# Patient Record
Sex: MALE | Race: WHITE | Employment: UNEMPLOYED | URBAN - METROPOLITAN AREA
[De-identification: names, ages, dates, MRNs, and addresses within clinical notes are randomized per-mention and may not be internally consistent; named-entity substitution may affect disease eponyms.]

---
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
## 2019-02-22 ENCOUNTER — OFFICE VISIT (OUTPATIENT)
Dept: PEDIATRIC ENDOCRINOLOGY | Age: 19
End: 2019-02-22

## 2019-02-22 VITALS
DIASTOLIC BLOOD PRESSURE: 76 MMHG | BODY MASS INDEX: 21.9 KG/M2 | HEART RATE: 59 BPM | WEIGHT: 150.4 LBS | OXYGEN SATURATION: 99 % | RESPIRATION RATE: 18 BRPM | SYSTOLIC BLOOD PRESSURE: 114 MMHG

## 2019-02-22 DIAGNOSIS — E10.9 TYPE 1 DIABETES MELLITUS WITHOUT COMPLICATION (HCC): Primary | ICD-10-CM

## 2019-02-22 LAB — HBA1C MFR BLD HPLC: 7.9 %

## 2019-02-22 RX ORDER — INSULIN GLARGINE 100 [IU]/ML
INJECTION, SOLUTION SUBCUTANEOUS
Qty: 30 ML | Refills: 4 | Status: SHIPPED | OUTPATIENT
Start: 2019-02-22 | End: 2020-04-21

## 2019-02-22 RX ORDER — PEN NEEDLE, DIABETIC 31 GX3/16"
NEEDLE, DISPOSABLE MISCELLANEOUS
Qty: 200 PEN NEEDLE | Refills: 4 | Status: SHIPPED | OUTPATIENT
Start: 2019-02-22

## 2019-02-22 NOTE — LETTER
2/22/2019 3:47 PM 
 
Patient:  Rafat Poster YOB: 2000 Date of Visit: 2/22/2019 Dear No Recipients: Thank you for referring Mr. Radha Martinez to me for evaluation/treatment. Below are the relevant portions of my assessment and plan of care. Chief Complaint Patient presents with  Diabetes f/u [address] 
[address] [facility] [address] [facility], [address] 
[phone] CC: F/U type 1 diabetes on injections History of present illness: 
 
Brian is a 22 y.o. male who [facility] for type 1 diabetes. He was present today with his girlfriend. Brian was originally diagnosed with diabetes in 2012. His last visit in diabetes clinic was on  1121/2018 and his hemoglobin A1c was 8.2%. Since then, he has remained well with no intercurrent illnesses, ED visits, or hospitalizations. He has had zero episodes of positive urine ketones since his last visit. Started college this fall and reports doing well with diabetes management at [facility] [place]. Blood glucoses:  Glucometer is available today. According to meter download, Brian is checking his BG an average of 2 times daily. The overall meter average is 136. See scanned chart Hypoglycemia: 3-4x/week Severe hypoglycemia requiring glucagon: none Hyperglycemia: about once/month. Negative ketones Insulin regimen: 
 
Lantus: 33units daily in the AM 
 
humalog: 
I:C : 1u:15g carbs ISF: 20 , target:100 Last Eye exam: earlier this year. Normal.  
 
Last flu shot: This flu season(11/2018) Medical ID: yes Screening labs: 
TSH Date Value Ref Range Status 12/26/2018 1.040 0.450 - 4.500 uIU/mL Final  
 
No components found for: Macarena Schmitz Lab Results Component Value Date/Time  Cholesterol, total 118 12/26/2018 04:29 PM  
 HDL Cholesterol 40 12/26/2018 04:29 PM  
 LDL, calculated 61 12/26/2018 04:29 PM  
 VLDL, calculated 17 12/26/2018 04:29 PM  
 Triglyceride 83 12/26/2018 04:29 PM  
 
 
 
 
Past Medical History:  
Diagnosis Date  Diabetes mellitus ([address]  Eczema  Unspecified adverse effect of anesthesia   
 grandmother gets \"very nausea\" Social History: In [facility] Review of systems: 
12 point ROS completed by me and is negative except as indicated above in HPI Medications: 
Current Outpatient Medications Medication Sig  Acetone, Urine, Test (KETOSTIX) strip Check urine ketones if blood sugar >350 or illness. Disp 2- 1 home,  1-school  glucose blood VI test strips (ACCU-CHEK ARCHANA) strip To test up to 6 times daily as directed.  insulin glargine (LANTUS SOLOSTAR U-100 INSULIN) 100 unit/mL (3 mL) inpn Inject 38 units daily.  Insulin Needles, Disposable, (JOSÉ MIGUEL PEN NEEDLE) 32 gauge x 5/32\" ndle Use to inject up to 100 units of insulin daily as directed  glucagon (GLUCAGON EMERGENCY KIT, HUMAN,) 1 mg injection Inject 1 ml into thigh muscle for severe hypogylcemia and semi unconsciousness. Disp 2- 1 home, 1 school  Lancets (ACCU-CHEK FASTCLIX LANCING DEV) misc To test up to 10 times daily as directed.  insulin lispro (HUMALOG KWIKPEN INSULIN) 100 unit/mL kwikpen Inject up to 100 units daily.  Acetone, Urine, Test (KETONE URINE TEST) strip To test prn  Blood-Glucose Meter (ONETOUCH VERIO FLEX) misc Use as directed No current facility-administered medications for this visit. Allergies: 
No Known Allergies Objective:  
 
 
Visit Vitals /76 (BP 1 Location: Right arm, BP Patient Position: Sitting) Pulse (!) 59 Resp 18 Wt 150 lb 6.4 oz (68.2 kg) SpO2 99% BMI 21.90 kg/m² No height on file for this encounter. Weight: 42 %ile (Z= -0.20) based on CDC (Boys, 2-20 Years) weight-for-age data using vitals from 2/22/2019. Height: No height on file for this encounter. BMI: Body mass index is 21.9 kg/m². Percentile: 36 %ile (Z= -0.36) based on CDC (Boys, 2-20 Years) BMI-for-age data using weight from 2/22/2019 and height from 11/21/2018. Change in weight: increase by 2.1kg in 3months Change in height: relatively unchanged In general, Nicki Fischer is alert, well-appearing and in no acute distress. HEENT: normocephalic, atraumatic. Pupils are equal, round and reactive to light. Extraocular movements are intact. Good dentition. Oropharynx is clear, mucous membranes moist. Neck is supple without lymphadenopathy. Thyroid is smooth and not enlarged. Chest: Clear to auscultation bilaterally with normal respiratory effort. CV: Normal S1/S2 without murmur. Abdomen is soft, nontender, nondistended, no hepatosplenomegaly. Skin is warm and well perfused. Injection sites:  clear without evidence of lipohypertrophy. Neuro demonstrates normal tone and strength throughout. Sexual development: stage deferred(previous exam adult) Laboratory data: 
No components found for: QUARTERMAIN Recent Results (from the past 12 hour(s)) AMB POC HEMOGLOBIN A1C Collection Time: 02/22/19  3:19 PM  
Result Value Ref Range Hemoglobin A1c (POC) 7.9 % Yearly screening labs done in 12/2018 showed normal lipid panel, normal celiac screen, normal thyroid screen. Insufficiency vitamin D level. Assessment:  
 
 
Nicki Fischer is a 22 y.o. male presenting for follow up of type 1 diabetes, under good control. Hemoglobin A1c today is 7.9% above ADA target of <7.5%, decreased from the last visit. BG averages improving with multiple lows. We would make some insulin dose changes. Send us records in a week for any insulin dose adjustment or sooner if you are having any lows. Reinforced the importance of checking BGs before meals and bolusing for all premeal BG and carbs. We would follow up on DEXCOM CGM G6.  
 
 
Medical ID recommended at all times. Return to clinic in 3 months. Plan: Reviewed growth charts and labs with family Reviewed hypoglycemia and how to manage hypoglycemia including when to use glucagon (for severe hypoglycemia, LOC,seizure) Reviewed ketones check and how to management positve ketones with family Hemoglobin A1C reviewed. Correlation between A1C and long term complications like neuropathy, nephropathy and retinopathy reviewed. Acute complications like diabetes ketoacidosis and dehydration and electrolyte abnormalities discussed Follow up in 3 months We discussed adult endo follow up Patient Instructions Seen for follow for type 1 diabetes.  HbA1c today is 7.9%.  Target is <7.5%.  
   
   
Plan Importance of compliance reinforced Send us records in a week to review for any insulin dose adjustements Review checking ketones when vomiting, 2 consecutive blood glucose above 350,  illness When trace or small drink more water and keep checking until negative. If moderate or large give us a call #[phone] Target before activity >120, if below get something with carbs,protein and fat (granula bar)  
   
Yearly eye exams are recommended after you have had diabetes for 3-5 years Dental exams every 6 months are recommended Flu vaccine is recommended every year, as early in the season as possible Medical ID should be worn at all times Continue rotating injection/insertion sites Annual labs are due: 12/2019 
   
Discussed diabetes and alcohol as well as diabetes and driving    
   
New insulin regimen 
   
Lantus: 33units daily in the AM 
   
Novolog: 
I:C : 1u:20g carbs 
  
 Target: 100 ISF: 20 Orders Placed This Encounter  AMB POC HEMOGLOBIN A1C  Acetone, Urine, Test (KETOSTIX) strip Sig: Check urine ketones if blood sugar >350 or illness. Disp 2- 1 home,  1-school Dispense:  100 Strip Refill:  4  
 glucose blood VI test strips (ACCU-CHEK ARCHANA) strip Sig: To test up to 6 times daily as directed. Dispense:  200 Strip Refill:  4  
 insulin glargine (LANTUS SOLOSTAR U-100 INSULIN) 100 unit/mL (3 mL) inpn Sig: Inject 38 units daily. Dispense:  30 mL Refill:  4  
 Insulin Needles, Disposable, (JOSÉ MIGUEL PEN NEEDLE) 32 gauge x 5/32\" ndle Sig: Use to inject up to 100 units of insulin daily as directed Dispense:  200 Pen Needle Refill:  4  
 glucagon (GLUCAGON EMERGENCY KIT, HUMAN,) 1 mg injection Sig: Inject 1 ml into thigh muscle for severe hypogylcemia and semi unconsciousness. Disp 2- 1 home, 1 school Dispense:  2 mL Refill:  1 Total time: 40minutes Time spent counseling patient/family: 50%. Reviewed hypoglycemia and how to management hypoglycemia including when to use glucagon. Discussed teaching his closet friend when and how to use glucagon. Chief Complaint Patient presents with  Diabetes f/u Insulin regimen: 
  
Lantus: 33 units daily in the AM, having lows has decreased it from 38 units.  
  
Humalog: 
I:C : 1unit:15g carbs 
  
ISF: 20 , target:100 Rotating sites well, legs, arms. Interested in [place], will send in old CMN from 8/2018 and new office visit. Transition paperwork given . If you have questions, please do not hesitate to call me. I look forward to following  Alek Lundy along with you.  
 
 
 
Sincerely, 
 
 
Rafia Badillo MD

## 2019-02-22 NOTE — PROGRESS NOTES
Chief Complaint   Patient presents with    Diabetes     f/u     Insulin regimen:     Lantus: 33 units daily in the AM, having lows has decreased it from 38 units.      Humalog:  I:C : 1unit:15g carbs     ISF: 20 , target:100    Rotating sites well, legs, arms. Interested in [place], will send in old CMN from 8/2018 and new office visit. Transition paperwork given .

## 2019-02-22 NOTE — PATIENT INSTRUCTIONS
Seen for follow for type 1 diabetes.  HbA1c today is 7.9%.  Target is <7.5%.           Plan  Importance of compliance reinforced    Send us records in a week to review for any insulin dose adjustements  Review checking ketones when vomiting, 2 consecutive blood glucose above 350,  illness  When trace or small drink more water and keep checking until negative.  If moderate or large give us a call #[phone]  Target before activity >120, if below get something with carbs,protein and fat (granula bar)       Yearly eye exams are recommended after you have had diabetes for 3-5 years  Dental exams every 6 months are recommended  Flu vaccine is recommended every year, as early in the season as possible  Medical ID should be worn at all times  Continue rotating injection/insertion sites  Annual labs are due: 12/2019      Discussed diabetes and alcohol as well as diabetes and driving          New insulin regimen      Lantus: 33units daily in the AM      Novolog:  I:C : 1u:20g carbs      Target: 100  ISF: 20

## 2019-02-22 NOTE — PROGRESS NOTES
[address] [facility] [address]  [address] [facility], [address]  [phone]        CC: F/U type 1 diabetes on injections    History of present illness:    Bhargavi Johnson is a 22 y.o. male who [facility] for type [facility] diabetes. He was present today with his girlfriend. Bhargavi Johnson was originally diagnosed with diabetes in 2012. His last visit in diabetes clinic was on  1121/2018 and his hemoglobin A1c was 8.2%. Since then, he has remained well with no intercurrent illnesses, ED visits, or hospitalizations. He has had zero episodes of positive urine ketones since his last visit. Started college this fall and reports doing well with diabetes management at [facility]. Blood glucoses:  Glucometer is available today. According to meter download, Bhargavi Johnson is checking his BG an average of 2 times daily. The overall meter average is 136. See scanned chart    Hypoglycemia: 3-4x/week  Severe hypoglycemia requiring glucagon: none  Hyperglycemia: about once/month. Negative ketones    Insulin regimen:    Lantus: 33units daily in the AM    humalog:  I:C : 1u:15g carbs    ISF: 20 , target:100    Last Eye exam: earlier this year. Normal.     Last flu shot: This flu season(11/2018)    Medical ID: yes  Screening labs:  TSH   Date Value Ref Range Status   12/26/2018 1.040 0.450 - 4.500 uIU/mL Final     No components found for: Prince Robin  Lab Results   Component Value Date/Time    Cholesterol, total 118 12/26/2018 04:29 PM    HDL Cholesterol 40 12/26/2018 04:29 PM    LDL, calculated 61 12/26/2018 04:29 PM    VLDL, calculated 17 12/26/2018 04:29 PM    Triglyceride 83 12/26/2018 04:29 PM           Past Medical History:   Diagnosis Date    Diabetes mellitus ([address]     Eczema     Unspecified adverse effect of anesthesia     grandmother gets \"very nausea\"         Social History:   In college      Review of systems:  12 point ROS completed by me and is negative except as indicated above in HPI    Medications:  Current Outpatient Medications   Medication Sig    Acetone, Urine, Test (KETOSTIX) strip Check urine ketones if blood sugar >350 or illness. Disp 2- 1 home,  1-school    glucose blood VI test strips (ACCU-CHEK ARCHANA) strip To test up to 6 times daily as directed.  insulin glargine (LANTUS SOLOSTAR U-100 INSULIN) 100 unit/mL (3 mL) inpn Inject 38 units daily.  Insulin Needles, Disposable, (JOSÉ MIGUEL PEN NEEDLE) 32 gauge x 5/32\" ndle Use to inject up to 100 units of insulin daily as directed    glucagon (GLUCAGON EMERGENCY KIT, HUMAN,) 1 mg injection Inject 1 ml into thigh muscle for severe hypogylcemia and semi unconsciousness. Disp 2- 1 home, 1 school    Lancets (ACCU-CHEK FASTCLIX LANCING DEV) misc To test up to 10 times daily as directed.  insulin lispro (HUMALOG KWIKPEN INSULIN) 100 unit/mL kwikpen Inject up to 100 units daily.  Acetone, Urine, Test (KETONE URINE TEST) strip To test prn    Blood-Glucose Meter (ONETOUCH VERIO FLEX) misc Use as directed     No current facility-administered medications for this visit. Allergies:  No Known Allergies        Objective:       Visit Vitals  /76 (BP 1 Location: Right arm, BP Patient Position: Sitting)   Pulse (!) 59   Resp 18   Wt 150 lb 6.4 oz (68.2 kg)   SpO2 99%   BMI 21.90 kg/m²     No height on file for this encounter. Weight: 42 %ile (Z= -0.20) based on CDC (Boys, 2-20 Years) weight-for-age data using vitals from 2/22/2019. Height: No height on file for this encounter. BMI: Body mass index is 21.9 kg/m². Percentile: 36 %ile (Z= -0.36) based on CDC (Boys, 2-20 Years) BMI-for-age data using weight from 2/22/2019 and height from 11/21/2018. Change in weight: increase by 2.1kg in 3months  Change in height: relatively unchanged    In general, Jam Hernandez is alert, well-appearing and in no acute distress. HEENT: normocephalic, atraumatic. Pupils are equal, round and reactive to light. Extraocular movements are intact. Good dentition. Oropharynx is clear, mucous membranes moist. Neck is supple without lymphadenopathy. Thyroid is smooth and not enlarged. Chest: Clear to auscultation bilaterally with normal respiratory effort. CV: Normal S1/S2 without murmur. Abdomen is soft, nontender, nondistended, no hepatosplenomegaly. Skin is warm and well perfused. Injection sites:  clear without evidence of lipohypertrophy. Neuro demonstrates normal tone and strength throughout. Sexual development: stage deferred(previous exam adult)    Laboratory data:  No components found for: [id number]    Recent Results (from the past 12 hour(s))   AMB POC HEMOGLOBIN A1C    Collection Time: 02/22/19  3:19 PM   Result Value Ref Range    Hemoglobin A1c (POC) 7.9 %     Yearly screening labs done in 12/2018 showed normal lipid panel, normal celiac screen, normal thyroid screen. Insufficiency vitamin D level. Assessment:       Bart Llamas is a 22 y.o. male presenting for follow up of type 1 diabetes, under good control. Hemoglobin A1c today is 7.9% above ADA target of <7.5%, decreased from the last visit. BG averages improving with multiple lows. We would make some insulin dose changes. Send us records in a week for any insulin dose adjustment or sooner if you are having any lows. Reinforced the importance of checking BGs before meals and bolusing for all premeal BG and carbs. We would follow up on DEXCOM CGM G6.       Medical ID recommended at all times. Return to clinic in 3 months. Plan:   Reviewed growth charts and labs with family  Reviewed hypoglycemia and how to manage hypoglycemia including when to use glucagon (for severe hypoglycemia, LOC,seizure)  Reviewed ketones check and how to management positve ketones with family  Hemoglobin A1C reviewed. Correlation between A1C and long term complications like neuropathy, nephropathy and retinopathy reviewed.  Acute complications like diabetes ketoacidosis and dehydration and electrolyte abnormalities discussed  Follow up in 3 months    We discussed adult endo follow up    Patient Instructions   Seen for follow for type 1 diabetes.  HbA1c today is 7.9%.  Target is <7.5%.           Plan  Importance of compliance reinforced    Send us records in a week to review for any insulin dose adjustements  Review checking ketones when vomiting, 2 consecutive blood glucose above 350,  illness  When trace or small drink more water and keep checking until negative. If moderate or large give us a call #[phone]  Target before activity >120, if below get something with carbs,protein and fat (granula bar)       Yearly eye exams are recommended after you have had diabetes for 3-5 years  Dental exams every 6 months are recommended  Flu vaccine is recommended every year, as early in the season as possible  Medical ID should be worn at all times  Continue rotating injection/insertion sites  Annual labs are due: 12/2019      Discussed diabetes and alcohol as well as diabetes and driving          New insulin regimen      Lantus: 33units daily in the AM      Novolog:  I:C : 1u:20g carbs      Target: 100  ISF: 20        Orders Placed This Encounter    AMB POC HEMOGLOBIN A1C    Acetone, Urine, Test (KETOSTIX) strip     Sig: Check urine ketones if blood sugar >350 or illness. Disp 2- 1 home,  1-school     Dispense:  100 Strip     Refill:  4    glucose blood VI test strips (ACCU-CHEK ARCHANA) strip     Sig: To test up to 6 times daily as directed. Dispense:  200 Strip     Refill:  4    insulin glargine (LANTUS SOLOSTAR U-100 INSULIN) 100 unit/mL (3 mL) inpn     Sig: Inject 38 units daily.      Dispense:  30 mL     Refill:  4    Insulin Needles, Disposable, (JOSÉ MIGUEL PEN NEEDLE) 32 gauge x 5/32\" ndle     Sig: Use to inject up to 100 units of insulin daily as directed     Dispense:  200 Pen Needle     Refill:  4    glucagon (GLUCAGON EMERGENCY KIT, HUMAN,) 1 mg injection     Sig: Inject 1 ml into thigh muscle for severe hypogylcemia and semi unconsciousness. Disp 2- 1 home, 1 school     Dispense:  2 mL     Refill:  1       Total time: 40minutes  Time spent counseling patient/family: 50%. Reviewed hypoglycemia and how to management hypoglycemia including when to use glucagon. Discussed teaching his closet friend when and how to use glucagon.

## 2019-05-24 ENCOUNTER — OFFICE VISIT (OUTPATIENT)
Dept: PEDIATRIC ENDOCRINOLOGY | Age: 19
End: 2019-05-24

## 2019-05-24 VITALS
DIASTOLIC BLOOD PRESSURE: 76 MMHG | SYSTOLIC BLOOD PRESSURE: 117 MMHG | OXYGEN SATURATION: 98 % | HEIGHT: 70 IN | HEART RATE: 66 BPM | BODY MASS INDEX: 21.07 KG/M2 | WEIGHT: 147.2 LBS

## 2019-05-24 DIAGNOSIS — E10.9 TYPE 1 DIABETES MELLITUS WITHOUT COMPLICATION (HCC): Primary | ICD-10-CM

## 2019-05-24 LAB — HBA1C MFR BLD HPLC: 7.7 %

## 2019-05-24 NOTE — PATIENT INSTRUCTIONS
Seen for follow for type 1 diabetes.  HbA1c today is 7.7%.  Target is <7.5%.           Plan  Importance of compliance reinforced    Send us records in a week to review for any insulin dose adjustements  Review checking ketones when vomiting, 2 consecutive blood glucose above 350,  illness  When trace or small drink more water and keep checking until negative.  If moderate or large give us a call #[phone]  Target before activity >120, if below get something with carbs,protein and fat (granula bar)       Yearly eye exams are recommended after you have had diabetes for 3-5 years  Dental exams every 6 months are recommended  Flu vaccine is recommended every year, as early in the season as possible  Medical ID should be worn at all times  Continue rotating injection/insertion sites  Annual labs are due: 12/2019      Discussed diabetes and alcohol as well as diabetes and driving          New insulin regimen      Lantus: 31units daily in the AM      Novolog:  I:C : 1u:20g carbs      Target: 100  ISF: 20

## 2019-05-24 NOTE — PROGRESS NOTES
[address]  [address] [facility], [address]  [phone]        CC: F/U type 1 diabetes on injections    History of present illness:    Gildardo Phelps is a 20 y.o. male who [facility] for type 1 diabetes. He was present today with his girlfriend. Gildardo Phelps was originally diagnosed with diabetes in 2012. His last visit in diabetes clinic was on  02/22/2018 and his hemoglobin A1c was 7.9%. Since then, he has remained well with no intercurrent illnesses, ED visits, or hospitalizations. He has had zero episodes of positive urine ketones since his last visit. Started college this fall and reports doing well with diabetes management at [facility]. Blood glucoses:  Glucometer is available today. According to meter download, Gildardo Phelps is checking his BG an average of 2 times daily. The overall meter average is 160. See scanned chart    Hypoglycemia: 3x/week  Severe hypoglycemia requiring glucagon: none  Hyperglycemia: about once/month. Negative ketones    Insulin regimen:    Lantus: 33units daily in the AM    humalog:  I:C : 1u:20g carbs    ISF: 20 , target:100    Last Eye exam: earlier this year. Normal.     Last flu shot: This flu season(11/2018)    Medical ID: yes  Screening labs:  TSH   Date Value Ref Range Status   12/26/2018 1.040 0.450 - 4.500 uIU/mL Final     No components found for: Claude Childs  Lab Results   Component Value Date/Time    Cholesterol, total 118 12/26/2018 04:29 PM    HDL Cholesterol 40 12/26/2018 04:29 PM    LDL, calculated 61 12/26/2018 04:29 PM    VLDL, calculated 17 12/26/2018 04:29 PM    Triglyceride 83 12/26/2018 04:29 PM           Past Medical History:   Diagnosis Date    Diabetes mellitus ([address]     Eczema     Unspecified adverse effect of anesthesia     grandmother gets \"very nausea\"         Social History:   In [facility]      Review of systems:  12 point ROS completed by me and is negative except as indicated above in HPI    Medications:  Current Outpatient Medications   Medication Sig    Acetone, Urine, Test (KETOSTIX) strip Check urine ketones if blood sugar >350 or illness. Disp 2- 1 home,  1-school    glucose blood VI test strips (ACCU-CHEK ARCHANA) strip To test up to 6 times daily as directed.  insulin glargine (LANTUS SOLOSTAR U-100 INSULIN) 100 unit/mL (3 mL) inpn Inject 38 units daily.  Insulin Needles, Disposable, (JOSÉ MIGUEL PEN NEEDLE) 32 gauge x 5/32\" ndle Use to inject up to 100 units of insulin daily as directed    glucagon (GLUCAGON EMERGENCY KIT, HUMAN,) 1 mg injection Inject 1 ml into thigh muscle for severe hypogylcemia and semi unconsciousness. Disp 2- 1 home, 1 school    Lancets (ACCU-CHEK FASTCLIX LANCING DEV) misc To test up to 10 times daily as directed.  insulin lispro (HUMALOG KWIKPEN INSULIN) 100 unit/mL kwikpen Inject up to 100 units daily.  Acetone, Urine, Test (KETONE URINE TEST) strip To test prn    Blood-Glucose Meter (ONETOUCH VERIO FLEX) misc Use as directed     No current facility-administered medications for this visit. Allergies:  No Known Allergies        Objective:       Visit Vitals  /76 (BP 1 Location: Right arm, BP Patient Position: Sitting)   Pulse 66   Ht 5' 9.53\" (1.766 m)   Wt 147 lb 3.2 oz (66.8 kg)   SpO2 98%   BMI 21.41 kg/m²     Growth percentile SmartLinks can only be used for patients less than 21years old. Weight: Facility age limit for growth percentiles is 20 years. Height: Facility age limit for growth percentiles is 20 years. BMI: Body mass index is 21.41 kg/m². Percentile: Facility age limit for growth percentiles is 20 years. Wt Readings from Last 3 Encounters:   05/24/19 147 lb 3.2 oz (66.8 kg)   02/22/19 150 lb 6.4 oz (68.2 kg) (42 %, Z= -0.20)*   11/21/18 145 lb 12.8 oz (66.1 kg) (36 %, Z= -0.37)*     * Growth percentiles are based on CDC (Boys, 2-20 Years) data.      Ht Readings from Last 3 Encounters:   05/24/19 5' 9.53\" (1.766 m) 11/21/18 5' 9.49\" (1.765 m) (49 %, Z= -0.04)*   08/01/18 5' 9.29\" (1.76 m) (46 %, Z= -0.10)*     * Growth percentiles are based on [facility] (Boys, 2-20 Years) data. Body mass index is 21.41 kg/m². Facility age limit for growth percentiles is 20 years. Facility age limit for growth percentiles is 20 years. Facility age limit for growth percentiles is 20 years. In general, Manoj Conroy is alert, well-appearing and in no acute distress. HEENT: normocephalic, atraumatic. Pupils are equal, round and reactive to light. Extraocular movements are intact. Good dentition. Oropharynx is clear, mucous membranes moist. Neck is supple without lymphadenopathy. Thyroid is smooth and not enlarged. Chest: Clear to auscultation bilaterally with normal respiratory effort. CV: Normal S1/S2 without murmur. Abdomen is soft, nontender, nondistended, no hepatosplenomegaly. Skin is warm and well perfused. Injection sites:  clear without evidence of lipohypertrophy. Neuro demonstrates normal tone and strength throughout. Sexual development: stage deferred(previous exam adult)    Laboratory data:  No components found for: [id number]    Recent Results (from the past 12 hour(s))   AMB POC HEMOGLOBIN A1C    Collection Time: 05/24/19 10:29 AM   Result Value Ref Range    Hemoglobin A1c (POC) 7.7 %     Yearly screening labs done in 12/2018 showed normal lipid panel, normal celiac screen, normal thyroid screen. Insufficiency vitamin D level. Assessment:       Manoj Conroy is a 20 y.o. male presenting for follow up of type 1 diabetes, under good control. Hemoglobin A1c today is 7.7% above ADA target of <7.5%, decreased from the last visit. BG averages improving with multiple lows. We would make some insulin dose changes today as shown below. Send us records in a week for any insulin dose adjustment or sooner if you are having any lows. Reinforced the importance of checking BGs before meals and bolusing for all premeal BG and carbs.  He would follow up on DEXCOM CGM G6.       Medical ID recommended at all times. Return to clinic in 3 months. Plan:   Reviewed growth charts and labs with family  Reviewed hypoglycemia and how to manage hypoglycemia including when to use glucagon (for severe hypoglycemia, LOC,seizure)  Reviewed ketones check and how to management positve ketones with family  Hemoglobin A1C reviewed. Correlation between A1C and long term complications like neuropathy, nephropathy and retinopathy reviewed. Acute complications like diabetes ketoacidosis and dehydration and electrolyte abnormalities discussed  Follow up in 3 months    We again discussed adult endo follow up. He would call MD to make an appointment. Asked him to let us know once he had an appointment. Patient Instructions   Seen for follow for type 1 diabetes.  HbA1c today is 7.7%.  Target is <7.5%.           Plan  Importance of compliance reinforced    Send us records in a week to review for any insulin dose adjustements  Review checking ketones when vomiting, 2 consecutive blood glucose above 350,  illness  When trace or small drink more water and keep checking until negative. If moderate or large give us a call #[phone]  Target before activity >120, if below get something with carbs,protein and fat (granula bar)       Yearly eye exams are recommended after you have had diabetes for 3-5 years  Dental exams every 6 months are recommended  Flu vaccine is recommended every year, as early in the season as possible  Medical ID should be worn at all times  Continue rotating injection/insertion sites  Annual labs are due: 12/2019      Discussed diabetes and alcohol as well as diabetes and driving          New insulin regimen      Lantus: 31units daily in the AM      Novolog:  I:C : 1u:20g carbs      Target: 100  ISF: 20        Orders Placed This Encounter    AMB POC HEMOGLOBIN A1C       Total time: 40minutes  Time spent counseling patient/family: 50%.  Reviewed hypoglycemia and how to management hypoglycemia including when to use glucagon. Discussed teaching his closet friend when and how to use glucagon.

## 2019-05-24 NOTE — LETTER
5/24/2019 12:17 PM 
 
Patient:  Pennie Clarke YOB: 2000 Date of Visit: 5/24/2019 Dear No Recipients: Thank you for referring Mr. Thomas Diana to me for evaluation/treatment. Below are the relevant portions of my assessment and plan of care. Mingo Rivera. 
[address] [facility] [address] [place], [address] 
[phone] CC: F/U type 1 diabetes on injections History of present illness: 
 
Crispin Calderon is a 20 y.o. male who is [facility] for type 1 diabetes. He was present today with his girlfriend. Crispin Calderon was originally diagnosed with diabetes in 2012. His last visit in diabetes clinic was on  02/22/2018 and his hemoglobin A1c was 7.9%. Since then, he has remained well with no intercurrent illnesses, ED visits, or hospitalizations. He has had zero episodes of positive urine ketones since his last visit. Started college this fall and reports doing well with diabetes management at [facility]. Blood glucoses:  Glucometer is available today. According to meter download, Crispin Calderon is checking his BG an average of 2 times daily. The overall meter average is 160. See scanned chart Hypoglycemia: 3x/week Severe hypoglycemia requiring glucagon: none Hyperglycemia: about once/month. Negative ketones Insulin regimen: 
 
Lantus: 33units daily in the AM 
 
humalog: 
I:C : 1u:20g carbs ISF: 20 , target:100 Last Eye exam: earlier this year. Normal.  
 
Last flu shot: This flu season(11/2018) Medical ID: yes Screening labs: 
TSH Date Value Ref Range Status 12/26/2018 1.040 0.450 - 4.500 uIU/mL Final  
 
No components found for: Nasim Hagan Lab Results Component Value Date/Time Cholesterol, total 118 12/26/2018 04:29 PM  
 HDL Cholesterol 40 12/26/2018 04:29 PM  
 LDL, calculated 61 12/26/2018 04:29 PM  
 VLDL, calculated 17 12/26/2018 04:29 PM  
 Triglyceride 83 12/26/2018 04:29 PM  
 
 
 
 
Past Medical History: Diagnosis Date  Diabetes mellitus ([address]  Eczema  Unspecified adverse effect of anesthesia   
 grandmother gets \"very nausea\" Social History: In [facility] Review of systems: 
12 point ROS completed by me and is negative except as indicated above in HPI Medications: 
Current Outpatient Medications Medication Sig  Acetone, Urine, Test (KETOSTIX) strip Check urine ketones if blood sugar >350 or illness. Disp 2- 1 home,  1-school  glucose blood VI test strips (ACCU-CHEK ARCHANA) strip To test up to 6 times daily as directed.  insulin glargine (LANTUS SOLOSTAR U-100 INSULIN) 100 unit/mL (3 mL) inpn Inject 38 units daily.  Insulin Needles, Disposable, (JOSÉ MIGUEL PEN NEEDLE) 32 gauge x 5/32\" ndle Use to inject up to 100 units of insulin daily as directed  glucagon (GLUCAGON EMERGENCY KIT, HUMAN,) 1 mg injection Inject 1 ml into thigh muscle for severe hypogylcemia and semi unconsciousness. Disp 2- 1 home, 1 school  Lancets (ACCU-CHEK FASTCLIX LANCING DEV) misc To test up to 10 times daily as directed.  insulin lispro (HUMALOG KWIKPEN INSULIN) 100 unit/mL kwikpen Inject up to 100 units daily.  Acetone, Urine, Test (KETONE URINE TEST) strip To test prn  Blood-Glucose Meter (ONETOUCH VERIO FLEX) misc Use as directed No current facility-administered medications for this visit. Allergies: 
No Known Allergies Objective:  
 
 
Visit Vitals /76 (BP 1 Location: Right arm, BP Patient Position: Sitting) Pulse 66 Ht 5' 9.53\" (1.766 m) Wt 147 lb 3.2 oz (66.8 kg) SpO2 98% BMI 21.41 kg/m² Growth percentile SmartLinks can only be used for patients less than 21years old. Weight: Facility age limit for growth percentiles is 20 years. Height: Facility age limit for growth percentiles is 20 years. BMI: Body mass index is 21.41 kg/m². Percentile: Facility age limit for growth percentiles is 20 years. Wt Readings from Last 3 Encounters: 05/24/19 147 lb 3.2 oz (66.8 kg) 02/22/19 150 lb 6.4 oz (68.2 kg) (42 %, Z= -0.20)*  
11/21/18 145 lb 12.8 oz (66.1 kg) (36 %, Z= -0.37)* * Growth percentiles are based on [facility] (Boys, 2-20 Years) data. Ht Readings from Last 3 Encounters:  
05/24/19 5' 9.53\" (1.766 m)  
11/21/18 5' 9.49\" (1.765 m) (49 %, Z= -0.04)*  
08/01/18 5' 9.29\" (1.76 m) (46 %, Z= -0.10)* * Growth percentiles are based on [facility] (Boys, 2-20 Years) data. Body mass index is 21.41 kg/m². Facility age limit for growth percentiles is 20 years. Facility age limit for growth percentiles is 20 years. Facility age limit for growth percentiles is 20 years. In general, Steven Sifuentes is alert, well-appearing and in no acute distress. HEENT: normocephalic, atraumatic. Pupils are equal, round and reactive to light. Extraocular movements are intact. Good dentition. Oropharynx is clear, mucous membranes moist. Neck is supple without lymphadenopathy. Thyroid is smooth and not enlarged. Chest: Clear to auscultation bilaterally with normal respiratory effort. CV: Normal S1/S2 without murmur. Abdomen is soft, nontender, nondistended, no hepatosplenomegaly. Skin is warm and well perfused. Injection sites:  clear without evidence of lipohypertrophy. Neuro demonstrates normal tone and strength throughout. Sexual development: stage deferred(previous exam adult) Laboratory data: 
No components found for: QUARTERMAIN Recent Results (from the past 12 hour(s)) AMB POC HEMOGLOBIN A1C Collection Time: 05/24/19 10:29 AM  
Result Value Ref Range Hemoglobin A1c (POC) 7.7 % Yearly screening labs done in 12/2018 showed normal lipid panel, normal celiac screen, normal thyroid screen. Insufficiency vitamin D level. Assessment: Kathy Mai is a 20 y.o. male presenting for follow up of type 1 diabetes, under good control. Hemoglobin A1c today is 7.7% above ADA target of <7.5%, decreased from the last visit. BG averages improving with multiple lows. We would make some insulin dose changes today as shown below. Send us records in a week for any insulin dose adjustment or sooner if you are having any lows. Reinforced the importance of checking BGs before meals and bolusing for all premeal BG and carbs. He would follow up on DEXCOM CGM G6.  
 
 
Medical ID recommended at all times. Return to clinic in 3 months. Plan:  
Reviewed growth charts and labs with family Reviewed hypoglycemia and how to manage hypoglycemia including when to use glucagon (for severe hypoglycemia, LOC,seizure) Reviewed ketones check and how to management positve ketones with family Hemoglobin A1C reviewed. Correlation between A1C and long term complications like neuropathy, nephropathy and retinopathy reviewed. Acute complications like diabetes ketoacidosis and dehydration and electrolyte abnormalities discussed Follow up in 3 months We again discussed adult endo follow up. He would call MD to make an appointment. Asked him to let us know once he had an appointment. Patient Instructions Seen for follow for type 1 diabetes.  HbA1c today is 7.7%.  Target is <7.5%.  
   
   
Plan Importance of compliance reinforced Send us records in a week to review for any insulin dose adjustements Review checking ketones when vomiting, 2 consecutive blood glucose above 350,  illness When trace or small drink more water and keep checking until negative. If moderate or large give us a call #[phone] Target before activity >120, if below get something with carbs,protein and fat (granula bar)  
   
Yearly eye exams are recommended after you have had diabetes for 3-5 years Dental exams every 6 months are recommended Flu vaccine is recommended every year, as early in the season as possible Medical ID should be worn at all times Continue rotating injection/insertion sites Annual labs are due: 12/2019 
   
Discussed diabetes and alcohol as well as diabetes and driving    
   
New insulin regimen 
   
Lantus: 31units daily in the AM 
   
Novolog: 
I:C : 1u:20g carbs 
  
 Target: 100 ISF: 20 Orders Placed This Encounter  AMB POC HEMOGLOBIN A1C Total time: 40minutes Time spent counseling patient/family: 50%. Reviewed hypoglycemia and how to management hypoglycemia including when to use glucagon. Discussed teaching his closet friend when and how to use glucagon. If you have questions, please do not hesitate to call me. I look forward to following Mr. Lyle Areli along with you.  
 
 
 
Sincerely, 
 
 
Maddie Johnson MD

## 2019-07-16 ENCOUNTER — TELEPHONE (OUTPATIENT)
Dept: PEDIATRIC ENDOCRINOLOGY | Age: 19
End: 2019-07-16

## 2019-07-16 NOTE — TELEPHONE ENCOUNTER
07/16/19  10:28 AM    Left VM with Jay Jay Mayer to call and discuss Dexcom and if he was still interested.

## 2019-08-09 ENCOUNTER — OFFICE VISIT (OUTPATIENT)
Dept: PEDIATRIC ENDOCRINOLOGY | Age: 19
End: 2019-08-09

## 2019-08-09 VITALS
RESPIRATION RATE: 16 BRPM | HEART RATE: 57 BPM | TEMPERATURE: 97.7 F | SYSTOLIC BLOOD PRESSURE: 119 MMHG | WEIGHT: 148.2 LBS | BODY MASS INDEX: 21.22 KG/M2 | OXYGEN SATURATION: 99 % | HEIGHT: 70 IN | DIASTOLIC BLOOD PRESSURE: 70 MMHG

## 2019-08-09 DIAGNOSIS — E10.9 TYPE 1 DIABETES MELLITUS WITHOUT COMPLICATION (HCC): Primary | ICD-10-CM

## 2019-08-09 LAB — HBA1C MFR BLD HPLC: 8.7 %

## 2019-08-09 NOTE — PROGRESS NOTES
Chief Complaint   Patient presents with    Follow-up     Diabetes     Pt stated he is here to transfer care to an adult endocrinologist.

## 2019-08-09 NOTE — LETTER
8/9/19 Patient: Basil Millan YOB: 2000 Date of Visit: 8/9/2019 Ratna Ca MD 
VIA Dear Ratna Ca MD, Thank you for referring Mr. Jerri Alvarez to [address] [place] [address] for evaluation. My notes for this consultation are attached. Chief Complaint Patient presents with  Follow-up Diabetes Pt stated he is here to transfer care to an adult endocrinologist. 
 
[address] 
[address] [facility] [address] [place], [address] 
[phone] CC: F/U type 1 diabetes on injections History of present illness: 
 
Erin Roberts is a 20 y.o. male who [facility] for type 1 diabetes. He was present today with his girlfriend. Erin Roberts was originally diagnosed with diabetes in 2012. His last visit in diabetes clinic was on  05/24/2018 and his hemoglobin A1c was 7.7%. Since then, he has remained well with no intercurrent illnesses, ED visits, or hospitalizations. He has had zero episodes of positive urine ketones since his last visit. Started college last fall and reports doing well with diabetes management at [facility]. Blood glucoses:  Glucometer is available today. According to meter download, Erin Roberts is checking his BG an average of 1.8 times daily. The overall meter average is 189. See scanned chart Hypoglycemia: 2x/week Severe hypoglycemia requiring glucagon: none Hyperglycemia: >300 about once/week. Negative ketones Insulin regimen: 
 
Lantus: 33units daily in the AM 
 
humalog: 
I:C : 1u:20g carbs ISF: 20 , target:100 Last Eye exam: earlier this year. Normal.  
 
Last flu shot: This flu season(11/2018) Medical ID: yes Screening labs: 
TSH Date Value Ref Range Status 12/26/2018 1.040 0.450 - 4.500 uIU/mL Final  
 
No components found for: Linda Hal Lab Results Component Value Date/Time  Cholesterol, total 118 12/26/2018 04:29 PM  
 HDL Cholesterol 40 12/26/2018 04:29 PM  
 LDL, calculated 61 12/26/2018 04:29 PM  
 VLDL, calculated 17 12/26/2018 04:29 PM  
 Triglyceride 83 12/26/2018 04:29 PM  
 
 
 
 
Past Medical History:  
Diagnosis Date  Diabetes mellitus ([address]  Eczema  Unspecified adverse effect of anesthesia   
 grandmother gets \"very nausea\" Social History: In [facility] Review of systems: 
12 point ROS completed by me and is negative except as indicated above in HPI Medications: 
Current Outpatient Medications Medication Sig  Acetone, Urine, Test (KETOSTIX) strip Check urine ketones if blood sugar >350 or illness. Disp 2- 1 home,  1-school  glucose blood VI test strips (ACCU-CHEK ARCHANA) strip To test up to 6 times daily as directed.  insulin glargine (LANTUS SOLOSTAR U-100 INSULIN) 100 unit/mL (3 mL) inpn Inject 38 units daily.  Insulin Needles, Disposable, (JOSÉ MIGUEL PEN NEEDLE) 32 gauge x 5/32\" ndle Use to inject up to 100 units of insulin daily as directed  glucagon (GLUCAGON EMERGENCY KIT, HUMAN,) 1 mg injection Inject 1 ml into thigh muscle for severe hypogylcemia and semi unconsciousness. Disp 2- 1 home, 1 school  Lancets (ACCU-CHEK FASTCLIX LANCING DEV) misc To test up to 10 times daily as directed.  insulin lispro (HUMALOG KWIKPEN INSULIN) 100 unit/mL kwikpen Inject up to 100 units daily.  Acetone, Urine, Test (KETONE URINE TEST) strip To test prn  Blood-Glucose Meter (ONETOUCH VERIO FLEX) misc Use as directed No current facility-administered medications for this visit. Allergies: 
No Known Allergies Objective:  
 
 
Visit Vitals /70 (BP 1 Location: Left arm, BP Patient Position: Sitting) Pulse (!) 57 Temp 97.7 °F (36.5 °C) (Oral) Resp 16 Ht 5' 10.08\" (1.78 m) Wt 148 lb 3.2 oz (67.2 kg) SpO2 99% BMI 21.22 kg/m² Growth percentile SmartLinks can only be used for patients less than 21years old. Weight: Facility age limit for growth percentiles is 20 years. Height: Facility age limit for growth percentiles is 20 years. BMI: Body mass index is 21.22 kg/m². Percentile: Facility age limit for growth percentiles is 20 years. Wt Readings from Last 3 Encounters:  
08/09/19 148 lb 3.2 oz (67.2 kg) 05/24/19 147 lb 3.2 oz (66.8 kg) 02/22/19 150 lb 6.4 oz (68.2 kg) (42 %, Z= -0.20)* * Growth percentiles are based on CDC (Boys, 2-20 Years) data. Ht Readings from Last 3 Encounters:  
08/09/19 5' 10.08\" (1.78 m) 05/24/19 5' 9.53\" (1.766 m)  
11/21/18 5' 9.49\" (1.765 m) (49 %, Z= -0.04)* * Growth percentiles are based on [facility] (Boys, 2-20 Years) data. Body mass index is 21.22 kg/m². Facility age limit for growth percentiles is 20 years. Facility age limit for growth percentiles is 20 years. Facility age limit for growth percentiles is 20 years. In general, Alena Chiu is alert, well-appearing and in no acute distress. HEENT: normocephalic, atraumatic. Pupils are equal, round and reactive to light. Extraocular movements are intact. Good dentition. Oropharynx is clear, mucous membranes moist. Neck is supple without lymphadenopathy. Thyroid is smooth and not enlarged. Chest: Clear to auscultation bilaterally with normal respiratory effort. CV: Normal S1/S2 without murmur. Abdomen is soft, nontender, nondistended, no hepatosplenomegaly. Skin is warm and well perfused. Injection sites:  clear without evidence of lipohypertrophy. Neuro demonstrates normal tone and strength throughout. Sexual development: stage deferred(previous exam adult) Laboratory data: 
No components found for: QUARTERMAIN Recent Results (from the past 12 hour(s)) AMB POC HEMOGLOBIN A1C Collection Time: 08/09/19  9:10 AM  
Result Value Ref Range Hemoglobin A1c (POC) 8.7 % Yearly screening labs done in 12/2018 showed normal lipid panel, normal celiac screen, normal thyroid screen. Insufficiency vitamin D level. Assessment:  
 
 
Felipe Velazco is a 20 y.o. male presenting for follow up of type 1 diabetes, under good control. Hemoglobin A1c today is 8.7% above ADA target of <7.5%, increased from the last visit. BG averages slightly above target with occasional lows. He is not checking BGs as recommended. Stressed the importance of checking BGs at least 4x/day and giving insulin for all premeal BGs and carbs. We would make no insulin dose changes today as shown below. Send us records in a week for any insulin dose adjustment or sooner if you are having any lows. Reinforced the importance of checking BGs before meals and bolusing for all premeal BG and carbs. He would follow up on DEXCOM CGM G6.  
 
 
Medical ID recommended at all times. Return to clinic in 3 months. He would make an appointment with adult endocrine Plan:  
Reviewed growth charts and labs with family Reviewed hypoglycemia and how to manage hypoglycemia including when to use glucagon (for severe hypoglycemia, LOC,seizure) Reviewed ketones check and how to management positve ketones with family Hemoglobin A1C reviewed. Correlation between A1C and long term complications like neuropathy, nephropathy and retinopathy reviewed. Acute complications like diabetes ketoacidosis and dehydration and electrolyte abnormalities discussed Follow up in 3 months Patient Instructions Seen for follow for type 1 diabetes.  HbA1c today is 8.7%.  Target is <7.5%.  
   
   
Plan Importance of compliance reinforced Send us records in a week to review for any insulin dose adjustements Review checking ketones when vomiting, 2 consecutive blood glucose above 350,  illness When trace or small drink more water and keep checking until negative. If moderate or large give us a call #[phone] Target before activity >120, if below get something with carbs,protein and fat (granula bar)  
   
Yearly eye exams are recommended after you have had diabetes for 3-5 years Dental exams every 6 months are recommended Flu vaccine is recommended every year, as early in the season as possible Medical ID should be worn at all times Continue rotating injection/insertion sites Annual labs are due: 12/2019 
   
Discussed diabetes and alcohol as well as diabetes and driving    
   
New insulin regimen 
   
Lantus: 33units daily in the AM 
   
Novolog: 
I:C : 1u:20g carbs 
  
 Target: 100 ISF: 20 Orders Placed This Encounter  AMB POC HEMOGLOBIN A1C Total time: 40minutes Time spent counseling patient/family: 50%. Reviewed hypoglycemia and how to management hypoglycemia including when to use glucagon. If you have questions, please do not hesitate to call me. I look forward to following your patient along with you.  
 
 
Sincerely, 
 
Margaret Mendes MD

## 2019-08-09 NOTE — PATIENT INSTRUCTIONS
Seen for follow for type 1 diabetes.  HbA1c today is 8.7%.  Target is <7.5%.  
   
   
Plan Importance of compliance reinforced Send us records in a week to review for any insulin dose adjustements Review checking ketones when vomiting, 2 consecutive blood glucose above 350,  illness When trace or small drink more water and keep checking until negative. If moderate or large give us a call #[phone] Target before activity >120, if below get something with carbs,protein and fat (granula bar)  
   
Yearly eye exams are recommended after you have had diabetes for 3-5 years Dental exams every 6 months are recommended Flu vaccine is recommended every year, as early in the season as possible Medical ID should be worn at all times Continue rotating injection/insertion sites Annual labs are due: 12/2019 
   
Discussed diabetes and alcohol as well as diabetes and driving    
   
New insulin regimen 
   
Lantus: 33units daily in the AM 
   
Novolog: 
I:C : 1u:20g carbs 
  
 Target: 100 ISF: 20

## 2019-08-09 NOTE — PROGRESS NOTES
[address]  [address] [facility], [address]  [phone]        CC: F/U type 1 diabetes on injections    History of present illness:    Naomi Tang is a 20 y.o. male who is [facility] in [facility] for type 1 diabetes. He was present today with his girlfriend. Naomi Tang was originally diagnosed with diabetes in 2012. His last visit in diabetes clinic was on  05/24/2018 and his hemoglobin A1c was 7.7%. Since then, he has remained well with no intercurrent illnesses, ED visits, or hospitalizations. He has had zero episodes of positive urine ketones since his last visit. Started college last fall and reports doing well with diabetes management at [facility]. Blood glucoses:  Glucometer is available today. According to meter download, Naomi Tang is checking his BG an average of 1.8 times daily. The overall meter average is 189. See scanned chart    Hypoglycemia: 2x/week  Severe hypoglycemia requiring glucagon: none  Hyperglycemia: >300 about once/week. Negative ketones    Insulin regimen:    Lantus: 33units daily in the AM    humalog:  I:C : 1u:20g carbs    ISF: 20 , target:100    Last Eye exam: earlier this year. Normal.     Last flu shot: This flu season(11/2018)    Medical ID: yes  Screening labs:  TSH   Date Value Ref Range Status   12/26/2018 1.040 0.450 - 4.500 uIU/mL Final     No components found for: Bryant Frank  Lab Results   Component Value Date/Time    Cholesterol, total 118 12/26/2018 04:29 PM    HDL Cholesterol 40 12/26/2018 04:29 PM    LDL, calculated 61 12/26/2018 04:29 PM    VLDL, calculated 17 12/26/2018 04:29 PM    Triglyceride 83 12/26/2018 04:29 PM           Past Medical History:   Diagnosis Date    Diabetes mellitus ([address]     Eczema     Unspecified adverse effect of anesthesia     grandmother gets \"very nausea\"         Social History:   In [facility]      Review of systems:  12 point ROS completed by me and is negative except as indicated above in HPI    Medications:  Current Outpatient Medications   Medication Sig    Acetone, Urine, Test (KETOSTIX) strip Check urine ketones if blood sugar >350 or illness. Disp 2- 1 home,  1-school    glucose blood VI test strips (ACCU-CHEK ARCHANA) strip To test up to 6 times daily as directed.  insulin glargine (LANTUS SOLOSTAR U-100 INSULIN) 100 unit/mL (3 mL) inpn Inject 38 units daily.  Insulin Needles, Disposable, (JOSÉ MIGUEL PEN NEEDLE) 32 gauge x 5/32\" ndle Use to inject up to 100 units of insulin daily as directed    glucagon (GLUCAGON EMERGENCY KIT, HUMAN,) 1 mg injection Inject 1 ml into thigh muscle for severe hypogylcemia and semi unconsciousness. Disp 2- 1 home, 1 school    Lancets (ACCU-CHEK FASTCLIX LANCING DEV) misc To test up to 10 times daily as directed.  insulin lispro (HUMALOG KWIKPEN INSULIN) 100 unit/mL kwikpen Inject up to 100 units daily.  Acetone, Urine, Test (KETONE URINE TEST) strip To test prn    Blood-Glucose Meter (ONETOUCH VERIO FLEX) misc Use as directed     No current facility-administered medications for this visit. Allergies:  No Known Allergies        Objective:       Visit Vitals  /70 (BP 1 Location: Left arm, BP Patient Position: Sitting)   Pulse (!) 57   Temp 97.7 °F (36.5 °C) (Oral)   Resp 16   Ht 5' 10.08\" (1.78 m)   Wt 148 lb 3.2 oz (67.2 kg)   SpO2 99%   BMI 21.22 kg/m²     Growth percentile SmartLinks can only be used for patients less than 21years old. Weight: Facility age limit for growth percentiles is 20 years. Height: Facility age limit for growth percentiles is 20 years. BMI: Body mass index is 21.22 kg/m². Percentile: Facility age limit for growth percentiles is 20 years. Wt Readings from Last 3 Encounters:   08/09/19 148 lb 3.2 oz (67.2 kg)   05/24/19 147 lb 3.2 oz (66.8 kg)   02/22/19 150 lb 6.4 oz (68.2 kg) (42 %, Z= -0.20)*     * Growth percentiles are based on CDC (Boys, 2-20 Years) data.      Ht Readings from Last 3 Encounters: 08/09/19 5' 10.08\" (1.78 m)   05/24/19 5' 9.53\" (1.766 m)   11/21/18 5' 9.49\" (1.765 m) (49 %, Z= -0.04)*     * Growth percentiles are based on [facility] (Boys, 2-20 Years) data. Body mass index is 21.22 kg/m². Facility age limit for growth percentiles is 20 years. Facility age limit for growth percentiles is 20 years. Facility age limit for growth percentiles is 20 years. In general, Quiana Romero is alert, well-appearing and in no acute distress. HEENT: normocephalic, atraumatic. Pupils are equal, round and reactive to light. Extraocular movements are intact. Good dentition. Oropharynx is clear, mucous membranes moist. Neck is supple without lymphadenopathy. Thyroid is smooth and not enlarged. Chest: Clear to auscultation bilaterally with normal respiratory effort. CV: Normal S1/S2 without murmur. Abdomen is soft, nontender, nondistended, no hepatosplenomegaly. Skin is warm and well perfused. Injection sites:  clear without evidence of lipohypertrophy. Neuro demonstrates normal tone and strength throughout. Sexual development: stage deferred(previous exam adult)    Laboratory data:  No components found for: [id number]    Recent Results (from the past 12 hour(s))   AMB POC HEMOGLOBIN A1C    Collection Time: 08/09/19  9:10 AM   Result Value Ref Range    Hemoglobin A1c (POC) 8.7 %     Yearly screening labs done in 12/2018 showed normal lipid panel, normal celiac screen, normal thyroid screen. Insufficiency vitamin D level. Assessment:       Quiana Romero is a 20 y.o. male presenting for follow up of type 1 diabetes, under good control. Hemoglobin A1c today is 8.7% above ADA target of <7.5%, increased from the last visit. BG averages slightly above target with occasional lows. He is not checking BGs as recommended. Stressed the importance of checking BGs at least 4x/day and giving insulin for all premeal BGs and carbs. We would make no insulin dose changes today as shown below.  Send us records in a week for any insulin dose adjustment or sooner if you are having any lows. Reinforced the importance of checking BGs before meals and bolusing for all premeal BG and carbs. He would follow up on DEXCOM CGM G6.       Medical ID recommended at all times. Return to clinic in 3 months. He would make an appointment with adult endocrine    Plan:   Reviewed growth charts and labs with family  Reviewed hypoglycemia and how to manage hypoglycemia including when to use glucagon (for severe hypoglycemia, LOC,seizure)  Reviewed ketones check and how to management positve ketones with family  Hemoglobin A1C reviewed. Correlation between A1C and long term complications like neuropathy, nephropathy and retinopathy reviewed. Acute complications like diabetes ketoacidosis and dehydration and electrolyte abnormalities discussed  Follow up in 3 months      Patient Instructions   Seen for follow for type 1 diabetes.  HbA1c today is 8.7%.  Target is <7.5%.           Plan  Importance of compliance reinforced    Send us records in a week to review for any insulin dose adjustements  Review checking ketones when vomiting, 2 consecutive blood glucose above 350,  illness  When trace or small drink more water and keep checking until negative.  If moderate or large give us a call #[phone]  Target before activity >120, if below get something with carbs,protein and fat (granula bar)       Yearly eye exams are recommended after you have had diabetes for 3-5 years  Dental exams every 6 months are recommended  Flu vaccine is recommended every year, as early in the season as possible  Medical ID should be worn at all times  Continue rotating injection/insertion sites  Annual labs are due: 12/2019      Discussed diabetes and alcohol as well as diabetes and driving          New insulin regimen      Lantus: 33units daily in the AM      Novolog:  I:C : 1u:20g carbs      Target: 100  ISF: 20        Orders Placed This Encounter    AMB POC HEMOGLOBIN A1C Total time: 40minutes  Time spent counseling patient/family: 50%. Reviewed hypoglycemia and how to management hypoglycemia including when to use glucagon.

## 2019-11-26 ENCOUNTER — OFFICE VISIT (OUTPATIENT)
Dept: PEDIATRIC ENDOCRINOLOGY | Age: 19
End: 2019-11-26

## 2019-11-26 VITALS
HEART RATE: 72 BPM | HEIGHT: 70 IN | BODY MASS INDEX: 20.99 KG/M2 | TEMPERATURE: 97.5 F | DIASTOLIC BLOOD PRESSURE: 79 MMHG | WEIGHT: 146.6 LBS | SYSTOLIC BLOOD PRESSURE: 114 MMHG | OXYGEN SATURATION: 98 % | RESPIRATION RATE: 18 BRPM

## 2019-11-26 DIAGNOSIS — E10.9 TYPE 1 DIABETES MELLITUS WITHOUT COMPLICATION (HCC): Primary | ICD-10-CM

## 2019-11-26 DIAGNOSIS — Z23 ENCOUNTER FOR IMMUNIZATION: ICD-10-CM

## 2019-11-26 LAB — HBA1C MFR BLD HPLC: 8.2 %

## 2019-11-26 NOTE — PROGRESS NOTES
CDE ENCOUNTER    CDE met with Souleymane Mesa for follow up of type 1 diabetes. Diabetes Latest Ref Rng  11/26/2019 8/9/2019   Hgb A1c (POC) % 8.2 8.7     Currently arranging appointments to transition to adult endocrine provider: [facility] with whom his father also receives his diabetes care. Insurance information and release forms provided today to ease transition. Reviewed CGM options including Dexcom vs Freestyle Lobo, [place] interested in Chip Lobo sent to Dr Dana Thomas.        Ivy Renee RD, CDE    Time In: 1020  Time Out: 1035  Total Time Spent with Souleymane Mesa = 15 minutes

## 2019-11-26 NOTE — LETTER
11/26/19 Patient: Reji Puentes YOB: 2000 Date of Visit: 11/26/2019 Sandrine Conroy MD 
VIA Dear Sandrine Conroy MD, Thank you for referring Mr. Papito Hannon to [address] [place] [address] for evaluation. My notes for this consultation are attached. Chief Complaint Patient presents with  
 Other  
  diabetes f/u CDE ENCOUNTER 
 
CDE met with Reji Puentes for follow up of type 1 diabetes. Diabetes Latest Ref Rng  11/26/2019 8/9/2019 Hgb A1c (POC) % 8.2 8.7 Currently arranging appointments to transition to adult endocrine provider: [facility] with whom his father also receives his diabetes care. Insurance information and release forms provided today to ease transition. Reviewed CGM options including Dexcom vs Freestyle Brian Lobo interested in Yamil, Rx sent to Dr Trista North. Ivy Renee, RD, CDE Time In: 1020 Time Out: 1035 Total Time Spent with Reji Puentes = 15 minutes [facility]. 
[address] [facility] [address] [facility], [address] 
[phone] CC: F/U type 1 diabetes on injections History of present illness: 
 
Brian is a 20 y.o. male who [facility] for type [facility] diabetes. He was present today with his girlfriend. Brian was originally diagnosed with diabetes in 2012. His last visit in diabetes clinic was on 8/9/2019 and his hemoglobin A1c was 8.7%. Since then, he has remained well with no intercurrent illnesses, ED visits, or hospitalizations. He has had zero episodes of positive urine ketones since his last visit. Started college last fall and reports doing well with diabetes management at [facility]. Blood glucoses:  Glucometer is available today. According to meter download, Brian is checking his BG an average of 1.8 times daily. The overall meter average is 189. See scanned chart Hypoglycemia: 2x/week Severe hypoglycemia requiring glucagon: none Hyperglycemia: >300 about once/week. Negative ketones Insulin regimen: 
 
Lantus: 33units daily in the AM 
 
humalog: 
I:C : 1u:20g carbs ISF: 25 , target:100 Last Eye exam: earlier this year. Normal.  
 
Last flu shot: This season [received in clinic today). Medical ID: yes Screening labs: 
TSH Date Value Ref Range Status 12/26/2018 1.040 0.450 - 4.500 uIU/mL Final  
 
No components found for: [place] Lab Results Component Value Date/Time Cholesterol, total 118 12/26/2018 04:29 PM  
 HDL Cholesterol 40 12/26/2018 04:29 PM  
 LDL, calculated 61 12/26/2018 04:29 PM  
 VLDL, calculated 17 12/26/2018 04:29 PM  
 Triglyceride 83 12/26/2018 04:29 PM  
 
 
 
 
Past Medical History:  
Diagnosis Date  Diabetes mellitus ([address]  Eczema  Unspecified adverse effect of anesthesia   
 grandmother gets \"very nausea\" Social History: In [facility] Review of systems: 
12 point ROS completed by me and is negative except as indicated above in HPI Medications: 
Current Outpatient Medications Medication Sig  
 flash glucose scanning reader (FREESTYLE JEREMIAH 14 DAY READER) misc Used to monitor hypoglycemia  flash glucose sensor (FREESTYLE JEREMIAH 14 DAY SENSOR) kit Used to monitor hypoglycemia  Acetone, Urine, Test (KETOSTIX) strip Check urine ketones if blood sugar >350 or illness. Disp 2- 1 home,  1-school  glucose blood VI test strips (ACCU-CHEK ARCHANA) strip To test up to 6 times daily as directed.  insulin glargine (LANTUS SOLOSTAR U-100 INSULIN) 100 unit/mL (3 mL) inpn Inject 38 units daily.  Insulin Needles, Disposable, (JOSÉ MIGUEL PEN NEEDLE) 32 gauge x 5/32\" ndle Use to inject up to 100 units of insulin daily as directed  glucagon (GLUCAGON EMERGENCY KIT, HUMAN,) 1 mg injection Inject 1 ml into thigh muscle for severe hypogylcemia and semi unconsciousness. Disp 2- 1 home, 1 school  Lancets (ACCU-CHEK FASTCLIX LANCING DEV) misc To test up to 10 times daily as directed.  insulin lispro (HUMALOG KWIKPEN INSULIN) 100 unit/mL kwikpen Inject up to 100 units daily.  Acetone, Urine, Test (KETONE URINE TEST) strip To test prn  Blood-Glucose Meter (ONETOUCH VERIO FLEX) misc Use as directed No current facility-administered medications for this visit. Allergies: 
No Known Allergies Objective:  
 
 
Visit Vitals /79 (BP 1 Location: Right arm, BP Patient Position: Sitting) Pulse 72 Temp 97.5 °F (36.4 °C) (Oral) Resp 18 Ht 5' 10.08\" (1.78 m) Wt 146 lb 9.6 oz (66.5 kg) SpO2 98% BMI 20.99 kg/m² Growth percentile SmartLinks can only be used for patients less than 21years old. Weight: Facility age limit for growth percentiles is 20 years. Height: Facility age limit for growth percentiles is 20 years. BMI: Body mass index is 20.99 kg/m². Percentile: Facility age limit for growth percentiles is 20 years. Wt Readings from Last 3 Encounters:  
11/26/19 146 lb 9.6 oz (66.5 kg) 08/09/19 148 lb 3.2 oz (67.2 kg) 05/24/19 147 lb 3.2 oz (66.8 kg) Ht Readings from Last 3 Encounters:  
11/26/19 5' 10.08\" (1.78 m) 08/09/19 5' 10.08\" (1.78 m) 05/24/19 5' 9.53\" (1.766 m) Body mass index is 20.99 kg/m². Facility age limit for growth percentiles is 20 years. Facility age limit for growth percentiles is 20 years. Facility age limit for growth percentiles is 20 years. In general, Elisa Byrne is alert, well-appearing and in no acute distress. HEENT: normocephalic, atraumatic. Pupils are equal, round and reactive to light. Extraocular movements are intact. Good dentition. Oropharynx is clear, mucous membranes moist. Neck is supple without lymphadenopathy. Thyroid is smooth and not enlarged. Chest: Clear to auscultation bilaterally with normal respiratory effort. CV: Normal S1/S2 without murmur. Abdomen is soft, nontender, nondistended, no hepatosplenomegaly. Skin is warm and well perfused. Injection sites:  clear without evidence of lipohypertrophy. Neuro demonstrates normal tone and strength throughout. Sexual development: stage deferred(previous exam adult) Laboratory data: 
No components found for: QUARTERMAIN Recent Results (from the past 12 hour(s)) AMB POC HEMOGLOBIN A1C Collection Time: 11/26/19 10:24 AM  
Result Value Ref Range Hemoglobin A1c (POC) 8.2 % Yearly screening labs done in 12/2018 showed normal lipid panel, normal celiac screen, normal thyroid screen. Insufficiency vitamin D level. Assessment:  
 
 
Elisa Byrne is a 20 y.o. male presenting for follow up of type 1 diabetes, under good control. Hemoglobin A1c today is 8.2% above ADA target of <7.5%, decreased from the last visit. BG averages improving but slightly above target. He is not checking BGs as recommended. Stressed the importance of checking BGs at least 4x/day and giving insulin for all premeal BGs and carbs. We would make some insulin dose changes today as shown below. Send us records in a week for any insulin dose adjustment or sooner if you are having any lows. Reinforced the importance of checking BGs before meals and bolusing for all premeal BG and carbs. Expressed interest in the freestyle nolberto. Prescription put into the pharmacy to . Medical ID recommended at all times. Return to clinic in 3 months. He would make an appointment with adult endocrine. Plan: Reviewed growth charts and labs with family Reviewed hypoglycemia and how to manage hypoglycemia including when to use glucagon (for severe hypoglycemia, LOC,seizure) Reviewed ketones check and how to management positve ketones with family Hemoglobin A1C reviewed. Correlation between A1C and long term complications like neuropathy, nephropathy and retinopathy reviewed. Acute complications like diabetes ketoacidosis and dehydration and electrolyte abnormalities discussed Follow up in 3 months Patient Instructions Seen for follow for type 1 diabetes.  HbA1c today is 8.2%.  Target is <7.5%.  
   
   
Plan Importance of compliance reinforced Send us records in a week to review for any insulin dose adjustements Review checking ketones when vomiting, 2 consecutive blood glucose above 350,  illness When trace or small drink more water and keep checking until negative. If moderate or large give us a call #[phone] Target before activity >120, if below get something with carbs,protein and fat (granula bar)  
   
Yearly eye exams are recommended after you have had diabetes for 3-5 years Dental exams every 6 months are recommended Flu vaccine is recommended every year, as early in the season as possible Medical ID should be worn at all times Continue rotating injection/insertion sites Annual labs are due: Today 
   
Discussed diabetes and alcohol as well as diabetes and driving    
   
New insulin regimen 
   
Lantus: 33units daily in the AM 
   
Novolog: 
I:C : 1u:15g carbs 
  
 Target: 100 ISF: 20 Orders Placed This Encounter  Influenza virus vaccine (QUADRIVALENT PRES FREE SYRINGE) IM ([id number])  LIPID PANEL  
 T4, FREE  
 TSH 3RD GENERATION  
 TISSUE TRANSGLUTAM AB, IGA  IMMUNOGLOBULIN A  
 VITAMIN D, 25 HYDROXY  MICROALBUMIN, UR, RAND W/ MICROALB/CREAT RATIO  AMB POC HEMOGLOBIN A1C  
 flash glucose scanning reader (FREESTYLE JEREMIAH 14 DAY READER) [facility] [place] Sig: Used to monitor hypoglycemia Dispense:  6 Each Refill:  2  
 flash glucose sensor (FREESTYLE JEREMIAH 14 DAY SENSOR) kit Sig: Used to monitor hypoglycemia Dispense:  1 Kit Refill:  0 Patient will use phone if not covered Total time: 40minutes Time spent counseling patient/family: 50%. If you have questions, please do not hesitate to call me. I look forward to following your patient along with you.  
 
 
Sincerely, 
 
Luca Larsen MD

## 2019-11-26 NOTE — PROGRESS NOTES
[facility]  [address], [address]  [phone]        CC: F/U type 1 diabetes on injections    History of present illness:    Brina Hearn is a 20 y.o. male who [facility] for type 1 diabetes. He was present today with his girlfriend. Brina Hearn was originally diagnosed with diabetes in 2012. His last visit in diabetes clinic was on 8/9/2019 and his hemoglobin A1c was 8.7%. Since then, he has remained well with no intercurrent illnesses, ED visits, or hospitalizations. He has had zero episodes of positive urine ketones since his last visit. Started college last fall and reports doing well with diabetes management at [facility]. Blood glucoses:  Glucometer is available today. According to meter download, Brina Hearn is checking his BG an average of 1.8 times daily. The overall meter average is 189. See scanned chart    Hypoglycemia: 2x/week  Severe hypoglycemia requiring glucagon: none  Hyperglycemia: >300 about once/week. Negative ketones    Insulin regimen:    Lantus: 33units daily in the AM    humalog:  I:C : 1u:20g carbs    ISF: 25 , target:100    Last Eye exam: earlier this year. Normal.     Last flu shot: This season [received in clinic today). Medical ID: yes  Screening labs:  TSH   Date Value Ref Range Status   12/26/2018 1.040 0.450 - 4.500 uIU/mL Final     No components found for: Pati Logan  Lab Results   Component Value Date/Time    Cholesterol, total 118 12/26/2018 04:29 PM    HDL Cholesterol 40 12/26/2018 04:29 PM    LDL, calculated 61 12/26/2018 04:29 PM    VLDL, calculated 17 12/26/2018 04:29 PM    Triglyceride 83 12/26/2018 04:29 PM           Past Medical History:   Diagnosis Date    Diabetes mellitus ([address]     Eczema     Unspecified adverse effect of anesthesia     grandmother gets \"very nausea\"         Social History:   In [facility]      Review of systems:  12 point ROS completed by me and is negative except as indicated above in HPI    Medications:  Current Outpatient Medications   Medication Sig    flash glucose scanning reader (FREESTYLE JEREMIAH 14 DAY READER) misc Used to monitor hypoglycemia    flash glucose sensor (FREESTYLE JEREMIAH 14 DAY SENSOR) kit Used to monitor hypoglycemia    Acetone, Urine, Test (KETOSTIX) strip Check urine ketones if blood sugar >350 or illness. Disp 2- 1 home,  1-school    glucose blood VI test strips (ACCU-CHEK ARCHANA) strip To test up to 6 times daily as directed.  insulin glargine (LANTUS SOLOSTAR U-100 INSULIN) 100 unit/mL (3 mL) inpn Inject 38 units daily.  Insulin Needles, Disposable, (JOSÉ MIGUEL PEN NEEDLE) 32 gauge x 5/32\" ndle Use to inject up to 100 units of insulin daily as directed    glucagon (GLUCAGON EMERGENCY KIT, HUMAN,) 1 mg injection Inject 1 ml into thigh muscle for severe hypogylcemia and semi unconsciousness. Disp 2- 1 home, 1 school    Lancets (ACCU-CHEK FASTCLIX LANCING DEV) misc To test up to 10 times daily as directed.  insulin lispro (HUMALOG KWIKPEN INSULIN) 100 unit/mL kwikpen Inject up to 100 units daily.  Acetone, Urine, Test (KETONE URINE TEST) strip To test prn    Blood-Glucose Meter (ONETOUCH VERIO FLEX) misc Use as directed     No current facility-administered medications for this visit. Allergies:  No Known Allergies        Objective:       Visit Vitals  /79 (BP 1 Location: Right arm, BP Patient Position: Sitting)   Pulse 72   Temp 97.5 °F (36.4 °C) (Oral)   Resp 18   Ht 5' 10.08\" (1.78 m)   Wt 146 lb 9.6 oz (66.5 kg)   SpO2 98%   BMI 20.99 kg/m²     Growth percentile SmartLinks can only be used for patients less than 21years old. Weight: Facility age limit for growth percentiles is 20 years. Height: Facility age limit for growth percentiles is 20 years. BMI: Body mass index is 20.99 kg/m². Percentile: Facility age limit for growth percentiles is 20 years.     Wt Readings from Last 3 Encounters:   11/26/19 146 lb 9.6 oz (66.5 kg)   08/09/19 148 lb 3.2 oz (67.2 kg)   05/24/19 147 lb 3.2 oz (66.8 kg)     Ht Readings from Last 3 Encounters:   11/26/19 5' 10.08\" (1.78 m)   08/09/19 5' 10.08\" (1.78 m)   05/24/19 5' 9.53\" (1.766 m)     Body mass index is 20.99 kg/m². Facility age limit for growth percentiles is 20 years. Facility age limit for growth percentiles is 20 years. Facility age limit for growth percentiles is 20 years. In general, Eric Deluna is alert, well-appearing and in no acute distress. HEENT: normocephalic, atraumatic. Pupils are equal, round and reactive to light. Extraocular movements are intact. Good dentition. Oropharynx is clear, mucous membranes moist. Neck is supple without lymphadenopathy. Thyroid is smooth and not enlarged. Chest: Clear to auscultation bilaterally with normal respiratory effort. CV: Normal S1/S2 without murmur. Abdomen is soft, nontender, nondistended, no hepatosplenomegaly. Skin is warm and well perfused. Injection sites:  clear without evidence of lipohypertrophy. Neuro demonstrates normal tone and strength throughout. Sexual development: stage deferred(previous exam adult)    Laboratory data:  No components found for: [id number]    Recent Results (from the past 12 hour(s))   AMB POC HEMOGLOBIN A1C    Collection Time: 11/26/19 10:24 AM   Result Value Ref Range    Hemoglobin A1c (POC) 8.2 %     Yearly screening labs done in 12/2018 showed normal lipid panel, normal celiac screen, normal thyroid screen. Insufficiency vitamin D level. Assessment:       Eric Deluna is a 20 y.o. male presenting for follow up of type 1 diabetes, under good control. Hemoglobin A1c today is 8.2% above ADA target of <7.5%, decreased from the last visit. BG averages improving but slightly above target. He is not checking BGs as recommended. Stressed the importance of checking BGs at least 4x/day and giving insulin for all premeal BGs and carbs. We would make some insulin dose changes today as shown below.  Send us records in a week for any insulin dose adjustment or sooner if you are having any lows. Reinforced the importance of checking BGs before meals and bolusing for all premeal BG and carbs. Expressed interest in the freestyle nolberto. Prescription put into the pharmacy to . Medical ID recommended at all times. Return to clinic in 3 months. He would make an appointment with adult endocrine. Plan:   Reviewed growth charts and labs with family  Reviewed hypoglycemia and how to manage hypoglycemia including when to use glucagon (for severe hypoglycemia, LOC,seizure)  Reviewed ketones check and how to management positve ketones with family  Hemoglobin A1C reviewed. Correlation between A1C and long term complications like neuropathy, nephropathy and retinopathy reviewed. Acute complications like diabetes ketoacidosis and dehydration and electrolyte abnormalities discussed  Follow up in 3 months      Patient Instructions   Seen for follow for type 1 diabetes.  HbA1c today is 8.2%.  Target is <7.5%.           Plan  Importance of compliance reinforced    Send us records in a week to review for any insulin dose adjustements  Review checking ketones when vomiting, 2 consecutive blood glucose above 350,  illness  When trace or small drink more water and keep checking until negative. If moderate or large give us a call #[phone]  Target before activity >120, if below get something with carbs,protein and fat (granula bar)       Yearly eye exams are recommended after you have had diabetes for 3-5 years  Dental exams every 6 months are recommended  Flu vaccine is recommended every year, as early in the season as possible  Medical ID should be worn at all times  Continue rotating injection/insertion sites  Annual labs are due:  Today      Discussed diabetes and alcohol as well as diabetes and driving          New insulin regimen      Lantus: 33units daily in the AM      Novolog:  I:C : 1u:15g carbs      Target: 100  ISF: 20        Orders Placed This Encounter    Influenza virus vaccine (QUADRIVALENT PRES FREE SYRINGE) IM (02838)    LIPID PANEL    T4, FREE    TSH 3RD GENERATION    TISSUE TRANSGLUTAM AB, IGA    IMMUNOGLOBULIN A    VITAMIN D, 25 HYDROXY    MICROALBUMIN, UR, RAND W/ MICROALB/CREAT RATIO    AMB POC HEMOGLOBIN A1C    flash glucose scanning reader (FREESTYLE JEREMIAH 14 DAY READER) misc     Sig: Used to monitor hypoglycemia     Dispense:  6 Each     Refill:  2    flash glucose sensor (FREESTYLE JEREMIAH 14 DAY SENSOR) kit     Sig: Used to monitor hypoglycemia     Dispense:  1 Kit     Refill:  0     Patient will use phone if not covered       Total time: 40minutes  Time spent counseling patient/family: 50%.

## 2019-11-26 NOTE — PATIENT INSTRUCTIONS
Seen for follow for type 1 diabetes.  HbA1c today is 8.2%.  Target is <7.5%.           Plan  Importance of compliance reinforced    Send us records in a week to review for any insulin dose adjustements  Review checking ketones when vomiting, 2 consecutive blood glucose above 350,  illness  When trace or small drink more water and keep checking until negative. If moderate or large give us a call #[phone]  Target before activity >120, if below get something with carbs,protein and fat (granula bar)       Yearly eye exams are recommended after you have had diabetes for 3-5 years  Dental exams every 6 months are recommended  Flu vaccine is recommended every year, as early in the season as possible  Medical ID should be worn at all times  Continue rotating injection/insertion sites  Annual labs are due:  Today      Discussed diabetes and alcohol as well as diabetes and driving          New insulin regimen      Lantus: 33units daily in the AM      Novolog:  I:C : 1u:15g carbs      Target: 100  ISF: 20

## 2020-04-22 ENCOUNTER — DOCUMENTATION ONLY (OUTPATIENT)
Dept: PEDIATRIC ENDOCRINOLOGY | Age: 20
End: 2020-04-22

## 2020-04-22 NOTE — PROGRESS NOTES
04/22/20  10:27 AM    Called and left VM for Wilfred to call back, sharee was called into [facility] but we have not seen since 11/2019 and had reported he was transitioning to adult

## 2021-05-14 DIAGNOSIS — E10.9 TYPE 1 DIABETES MELLITUS WITHOUT COMPLICATION (HCC): ICD-10-CM

## 2021-05-14 RX ORDER — INSULIN GLARGINE 100 [IU]/ML
INJECTION, SOLUTION SUBCUTANEOUS
Qty: 30 ML | Refills: 4 | Status: SHIPPED | OUTPATIENT
Start: 2021-05-14

## 2021-05-14 NOTE — TELEPHONE ENCOUNTER
Reached out to [facility], asked them to contact family to update the provider to their new adult endocrinologist.

## 2023-02-03 ENCOUNTER — OFFICE VISIT (OUTPATIENT)
Dept: URGENT CARE | Age: 23
End: 2023-02-03
Payer: COMMERCIAL

## 2023-02-03 VITALS
DIASTOLIC BLOOD PRESSURE: 70 MMHG | BODY MASS INDEX: 21.22 KG/M2 | TEMPERATURE: 99.8 F | SYSTOLIC BLOOD PRESSURE: 105 MMHG | WEIGHT: 148.2 LBS | HEART RATE: 96 BPM | OXYGEN SATURATION: 98 %

## 2023-02-03 DIAGNOSIS — K52.9 ACUTE GASTROENTERITIS: Primary | ICD-10-CM

## 2023-02-03 DIAGNOSIS — R11.11 VOMITING WITHOUT NAUSEA, UNSPECIFIED VOMITING TYPE: ICD-10-CM

## 2023-02-03 DIAGNOSIS — R52 BODY ACHES: ICD-10-CM

## 2023-02-03 DIAGNOSIS — R73.09 ELEVATED GLUCOSE LEVEL: ICD-10-CM

## 2023-02-03 LAB
FLUAV+FLUBV AG NOSE QL IA.RAPID: NEGATIVE
FLUAV+FLUBV AG NOSE QL IA.RAPID: NEGATIVE
GLUCOSE POC: 325 MG/DL
SARS-COV-2 PCR, POC: NEGATIVE
VALID INTERNAL CONTROL?: YES

## 2023-02-03 RX ORDER — ONDANSETRON 4 MG/1
4 TABLET, ORALLY DISINTEGRATING ORAL
Qty: 4 TABLET | Refills: 0 | Status: SHIPPED | OUTPATIENT
Start: 2023-02-03

## 2023-02-03 NOTE — LETTER
NOTIFICATION RETURN TO WORK / SCHOOL    2/3/2023 7:44 PM    Mr. Emely Fong  [address] [address] [place] [address]      To Whom It May Concern:    Emely Fong is currently under the care of [facility]. Please excuse from work. He will return to work/school on: 02/07/2023    If there are questions or concerns please have the patient contact our office.         Sincerely,      E PROVIDER

## 2023-02-04 NOTE — PROGRESS NOTES
21 yo male type I diabetic here for flu like symptoms  Promotes 1 day ago had upset stomach. Took some pepto and threw it up. Then had multiple bouts of non bloody diarrhea. Denies severe abdominal pain but stomach is upset. He has been monitoring his glucose levels states highest was 125 this morning. Didn't take insulin due to not wanting to eat much today and BG this evening is noted to be   325. Denies any high readings earlier today. He has thrown up a total of 1x. He has body aches and low grade fever. Denies any SOB, dizziness, weakness, urinary symptoms or other URI symptoms.              Past Medical History:   Diagnosis Date    Diabetes mellitus ([facility] [address]     Eczema     Unspecified adverse effect of anesthesia     grandmother gets \"very nausea\"        Past Surgical History:   Procedure Laterality Date    HX ORTHOPAEDIC      pins for broken finger    HX WISDOM TEETH EXTRACTION  7/7/2016         Family History   Problem Relation Age of Onset    Diabetes Father     Diabetes Paternal Grandfather         type2    Diabetes Other         type1        Social History     Socioeconomic History    Marital status: SINGLE     Spouse name: Not on file    Number of children: Not on file    Years of education: Not on file    Highest education level: Not on file   Occupational History    Not on file   Tobacco Use    Smoking status: Never    Smokeless tobacco: Never   Substance and Sexual Activity    Alcohol use: No    Drug use: No    Sexual activity: Never   Other Topics Concern    Not on file   Social History Narrative    ** Merged History Encounter **          Social Determinants of Health     Financial Resource Strain: Not on file   Food Insecurity: Not on file   Transportation Needs: Not on file   Physical Activity: Not on file   Stress: Not on file   Social Connections: Not on file   Intimate Partner Violence: Not on file   Housing Stability: Not on file                ALLERGIES: Amoxicillin    Review of Systems All other systems reviewed and are negative. Vitals:    02/03/23 1840   BP: 105/70   Pulse: 96   Temp: 99.8 °F (37.7 °C)   SpO2: 98%   Weight: 148 lb 3.2 oz (67.2 kg)       Physical Exam  Vitals reviewed. Constitutional:       General: He is not in acute distress. Appearance: Normal appearance. He is not ill-appearing or diaphoretic. HENT:      Nose: No congestion or rhinorrhea. Mouth/Throat:      Mouth: Mucous membranes are moist.      Pharynx: No oropharyngeal exudate or posterior oropharyngeal erythema. Eyes:      Extraocular Movements: Extraocular movements intact. Cardiovascular:      Rate and Rhythm: Normal rate and regular rhythm. Pulses: Normal pulses. Heart sounds: Normal heart sounds. No murmur heard. No friction rub. No gallop. Pulmonary:      Effort: Pulmonary effort is normal. No respiratory distress. Breath sounds: Normal breath sounds. No stridor. No wheezing, rhonchi or rales. Abdominal:      Comments: Mild generalized non focal tenderness to abdomen with moderate palpation. No RLQ TTP or rebound tenderness. BS hyperactive. All other quadrants soft and non tender. Negative murphys sign. No hepatosplenomegaly. Musculoskeletal:      Cervical back: Neck supple. Lymphadenopathy:      Cervical: No cervical adenopathy. Skin:     Capillary Refill: Capillary refill takes less than 2 seconds. Coloration: Skin is not jaundiced or pale. Findings: No rash. Neurological:      Mental Status: He is alert and oriented to person, place, and time. Psychiatric:         Mood and Affect: Mood normal.         Behavior: Behavior normal.         Thought Content:  Thought content normal.       [facility]     Differential Diagnosis; Clinical Impression; Plan:       CLINICAL IMPRESSION:  (K52.9) Acute gastroenteritis  (primary encounter diagnosis)  (R52) Body aches  (R11.11) Vomiting without nausea, unspecified vomiting type    Orders Placed This Encounter ondansetron (ZOFRAN ODT) 4 mg disintegrating tablet          Sig: Take 1 Tablet by mouth every eight (8) hours as needed for Nausea or Vomiting. Dispense:  4 Tablet          Refill:  0      Plan:  Type I diabetic with presentation that suggests likely viral Gasteroenteritis. Mild generalized TTP. Non focal. Afebrile currently and is well hydrated. Glucose is 325 and this was discussed. He has not taken insulin due to low PO intake of food today but plans on eating and administering appropriate insulin coverage. He did not have high glucose leves preceding his symptoms and do not have concern for DKA at this time  Advised very close glucose monitoring, zofran to help keep foods/fluids down  He is fully aware to go to ED immediately for new, worsening, changes OR if he hasnt made some improvement over next 48 hours. We have reviewed concerning signs/symptoms, normal vs abnormal progression of medical condition and when to seek immediate medical attention.             Procedures

## 2023-05-16 NOTE — PROGRESS NOTES
Chief Complaint Patient presents with  Diabetes   f/u  
 
 
 Solaraze Counseling:  I discussed with the patient the risks of Solaraze including but not limited to erythema, scaling, itching, weeping, crusting, and pain.

## 2023-10-31 ENCOUNTER — HOSPITAL ENCOUNTER (EMERGENCY)
Facility: HOSPITAL | Age: 23
Discharge: HOME | End: 2023-11-01
Attending: STUDENT IN AN ORGANIZED HEALTH CARE EDUCATION/TRAINING PROGRAM
Payer: COMMERCIAL

## 2023-10-31 ENCOUNTER — APPOINTMENT (OUTPATIENT)
Facility: HOSPITAL | Age: 23
End: 2023-10-31
Payer: COMMERCIAL

## 2023-10-31 VITALS
RESPIRATION RATE: 18 BRPM | SYSTOLIC BLOOD PRESSURE: 136 MMHG | OXYGEN SATURATION: 100 % | DIASTOLIC BLOOD PRESSURE: 80 MMHG | HEART RATE: 85 BPM | TEMPERATURE: 97.9 F

## 2023-10-31 DIAGNOSIS — R63.4 WEIGHT LOSS: ICD-10-CM

## 2023-10-31 DIAGNOSIS — R10.9 ABDOMINAL PAIN, UNSPECIFIED ABDOMINAL LOCATION: Primary | ICD-10-CM

## 2023-10-31 DIAGNOSIS — R11.0 NAUSEA: ICD-10-CM

## 2023-10-31 LAB
ALBUMIN SERPL-MCNC: 4.1 G/DL (ref 3.5–5)
ALBUMIN/GLOB SERPL: 1.1 (ref 1.1–2.2)
ALP SERPL-CCNC: 75 U/L (ref 45–117)
ALT SERPL-CCNC: 33 U/L (ref 12–78)
ANION GAP SERPL CALC-SCNC: 4 MMOL/L (ref 5–15)
APPEARANCE UR: CLEAR
AST SERPL-CCNC: 9 U/L (ref 15–37)
BACTERIA URNS QL MICRO: NEGATIVE /HPF
BASOPHILS # BLD: 0.1 K/UL (ref 0–0.1)
BASOPHILS NFR BLD: 1 % (ref 0–1)
BILIRUB SERPL-MCNC: 0.8 MG/DL (ref 0.2–1)
BILIRUB UR QL: NEGATIVE
BUN SERPL-MCNC: 12 MG/DL (ref 6–20)
BUN/CREAT SERPL: 11 (ref 12–20)
CALCIUM SERPL-MCNC: 9.3 MG/DL (ref 8.5–10.1)
CHLORIDE SERPL-SCNC: 103 MMOL/L (ref 97–108)
CO2 SERPL-SCNC: 31 MMOL/L (ref 21–32)
COLOR UR: ABNORMAL
COMMENT:: NORMAL
CREAT SERPL-MCNC: 1.06 MG/DL (ref 0.7–1.3)
DIFFERENTIAL METHOD BLD: NORMAL
EOSINOPHIL # BLD: 0.1 K/UL (ref 0–0.4)
EOSINOPHIL NFR BLD: 1 % (ref 0–7)
EPITH CASTS URNS QL MICRO: ABNORMAL /LPF
ERYTHROCYTE [DISTWIDTH] IN BLOOD BY AUTOMATED COUNT: 12 % (ref 11.5–14.5)
GLOBULIN SER CALC-MCNC: 3.6 G/DL (ref 2–4)
GLUCOSE SERPL-MCNC: 273 MG/DL (ref 65–100)
GLUCOSE UR STRIP.AUTO-MCNC: >1000 MG/DL
HCT VFR BLD AUTO: 47 % (ref 36.6–50.3)
HGB BLD-MCNC: 16.4 G/DL (ref 12.1–17)
HGB UR QL STRIP: NEGATIVE
HYALINE CASTS URNS QL MICRO: ABNORMAL /LPF (ref 0–5)
IMM GRANULOCYTES # BLD AUTO: 0 K/UL (ref 0–0.04)
IMM GRANULOCYTES NFR BLD AUTO: 0 % (ref 0–0.5)
KETONES UR QL STRIP.AUTO: 15 MG/DL
LEUKOCYTE ESTERASE UR QL STRIP.AUTO: NEGATIVE
LIPASE SERPL-CCNC: 17 U/L (ref 13–75)
LYMPHOCYTES # BLD: 1.9 K/UL (ref 0.8–3.5)
LYMPHOCYTES NFR BLD: 18 % (ref 12–49)
MAGNESIUM SERPL-MCNC: 2 MG/DL (ref 1.6–2.4)
MCH RBC QN AUTO: 30.5 PG (ref 26–34)
MCHC RBC AUTO-ENTMCNC: 34.9 G/DL (ref 30–36.5)
MCV RBC AUTO: 87.5 FL (ref 80–99)
MONOCYTES # BLD: 0.8 K/UL (ref 0–1)
MONOCYTES NFR BLD: 7 % (ref 5–13)
NEUTS SEG # BLD: 7.6 K/UL (ref 1.8–8)
NEUTS SEG NFR BLD: 73 % (ref 32–75)
NITRITE UR QL STRIP.AUTO: NEGATIVE
NRBC # BLD: 0 K/UL (ref 0–0.01)
NRBC BLD-RTO: 0 PER 100 WBC
PH UR STRIP: 6 (ref 5–8)
PLATELET # BLD AUTO: 303 K/UL (ref 150–400)
PMV BLD AUTO: 10.8 FL (ref 8.9–12.9)
POTASSIUM SERPL-SCNC: 4.7 MMOL/L (ref 3.5–5.1)
PROT SERPL-MCNC: 7.7 G/DL (ref 6.4–8.2)
PROT UR STRIP-MCNC: NEGATIVE MG/DL
RBC # BLD AUTO: 5.37 M/UL (ref 4.1–5.7)
RBC #/AREA URNS HPF: ABNORMAL /HPF (ref 0–5)
SODIUM SERPL-SCNC: 138 MMOL/L (ref 136–145)
SP GR UR REFRACTOMETRY: >1.03 (ref 1–1.03)
SPECIMEN HOLD: NORMAL
SPECIMEN HOLD: NORMAL
UROBILINOGEN UR QL STRIP.AUTO: 0.2 EU/DL (ref 0.2–1)
WBC # BLD AUTO: 10.4 K/UL (ref 4.1–11.1)
WBC URNS QL MICRO: ABNORMAL /HPF (ref 0–4)

## 2023-10-31 PROCEDURE — 2580000003 HC RX 258

## 2023-10-31 PROCEDURE — 83690 ASSAY OF LIPASE: CPT

## 2023-10-31 PROCEDURE — 80053 COMPREHEN METABOLIC PANEL: CPT

## 2023-10-31 PROCEDURE — 74177 CT ABD & PELVIS W/CONTRAST: CPT

## 2023-10-31 PROCEDURE — 6370000000 HC RX 637 (ALT 250 FOR IP): Performed by: PHYSICIAN ASSISTANT

## 2023-10-31 PROCEDURE — 83735 ASSAY OF MAGNESIUM: CPT

## 2023-10-31 PROCEDURE — 85025 COMPLETE CBC W/AUTO DIFF WBC: CPT

## 2023-10-31 PROCEDURE — 6360000004 HC RX CONTRAST MEDICATION

## 2023-10-31 PROCEDURE — 81001 URINALYSIS AUTO W/SCOPE: CPT

## 2023-10-31 PROCEDURE — 36415 COLL VENOUS BLD VENIPUNCTURE: CPT

## 2023-10-31 PROCEDURE — 96374 THER/PROPH/DIAG INJ IV PUSH: CPT

## 2023-10-31 PROCEDURE — 6360000002 HC RX W HCPCS

## 2023-10-31 PROCEDURE — 99285 EMERGENCY DEPT VISIT HI MDM: CPT

## 2023-10-31 RX ORDER — ACETAMINOPHEN 500 MG
1000 TABLET ORAL
Status: COMPLETED | OUTPATIENT
Start: 2023-10-31 | End: 2023-10-31

## 2023-10-31 RX ORDER — 0.9 % SODIUM CHLORIDE 0.9 %
1000 INTRAVENOUS SOLUTION INTRAVENOUS ONCE
Status: COMPLETED | OUTPATIENT
Start: 2023-10-31 | End: 2023-11-01

## 2023-10-31 RX ORDER — DICYCLOMINE HYDROCHLORIDE 10 MG/1
10 CAPSULE ORAL
Qty: 15 CAPSULE | Refills: 0 | Status: SHIPPED | OUTPATIENT
Start: 2023-10-31

## 2023-10-31 RX ORDER — KETOROLAC TROMETHAMINE 30 MG/ML
15 INJECTION, SOLUTION INTRAMUSCULAR; INTRAVENOUS ONCE
Status: COMPLETED | OUTPATIENT
Start: 2023-10-31 | End: 2023-10-31

## 2023-10-31 RX ORDER — ONDANSETRON 4 MG/1
4 TABLET, ORALLY DISINTEGRATING ORAL ONCE
Status: COMPLETED | OUTPATIENT
Start: 2023-10-31 | End: 2023-10-31

## 2023-10-31 RX ORDER — ONDANSETRON 4 MG/1
4 TABLET, FILM COATED ORAL EVERY 8 HOURS PRN
Qty: 15 TABLET | Refills: 0 | Status: SHIPPED | OUTPATIENT
Start: 2023-10-31

## 2023-10-31 RX ADMIN — SODIUM CHLORIDE 1000 ML: 9 INJECTION, SOLUTION INTRAVENOUS at 23:06

## 2023-10-31 RX ADMIN — ONDANSETRON 4 MG: 4 TABLET, ORALLY DISINTEGRATING ORAL at 18:50

## 2023-10-31 RX ADMIN — KETOROLAC TROMETHAMINE 15 MG: 30 INJECTION, SOLUTION INTRAMUSCULAR; INTRAVENOUS at 23:04

## 2023-10-31 RX ADMIN — ACETAMINOPHEN 1000 MG: 500 TABLET ORAL at 18:50

## 2023-10-31 RX ADMIN — IOPAMIDOL 100 ML: 755 INJECTION, SOLUTION INTRAVENOUS at 21:31

## 2023-10-31 ASSESSMENT — ENCOUNTER SYMPTOMS
DIARRHEA: 0
RHINORRHEA: 0
SORE THROAT: 0
VOMITING: 0
COUGH: 0
ABDOMINAL PAIN: 1
NAUSEA: 0
SHORTNESS OF BREATH: 0

## 2023-10-31 ASSESSMENT — PAIN DESCRIPTION - LOCATION: LOCATION: ABDOMEN

## 2023-10-31 ASSESSMENT — PAIN - FUNCTIONAL ASSESSMENT: PAIN_FUNCTIONAL_ASSESSMENT: NONE - DENIES PAIN

## 2023-10-31 ASSESSMENT — PAIN SCALES - GENERAL: PAINLEVEL_OUTOF10: 6

## 2023-10-31 NOTE — ED PROVIDER NOTES
these medications    Details   ondansetron (ZOFRAN) 4 MG tablet Take 1 tablet by mouth every 8 hours as needed for Nausea or Vomiting, Disp-15 tablet, R-0Normal      dicyclomine (BENTYL) 10 MG capsule Take 1 capsule by mouth every 6-8 hours as needed (stomach pain/cramping), Disp-15 capsule, R-0Normal               (Please note that portions of this note were completed with a voice recognition program.  Efforts were made to edit the dictations but occasionally words are mis-transcribed.)    Jose A Garcia PA-C (electronically signed)  Emergency Attending Physician / Physician Assistant / Nurse Practitioner             Jose A Garcia PA-C  11/01/23 [id number]

## 2023-10-31 NOTE — ED TRIAGE NOTES
Pt reports that for the past few weeks it ha been difficult for him to eat, he has lost weight, and his BG has been low. He reports type 1 diabetes.

## 2023-11-01 NOTE — DISCHARGE INSTRUCTIONS
As discussed, your workup today is negative for any emergent findings. Follow up with your PCP for further management. Return to the ER if you experience severe or worsening symptoms.